# Patient Record
(demographics unavailable — no encounter records)

---

## 2025-01-10 NOTE — PHYSICAL EXAM
[TextEntry] : General:  alert, no acute distress   Head:  normocephalic   Eyes:  EOMI bilateral   Ears:  clear tympanic membranes with bony landmarks and light reflex present bilaterally   Nose:  pink nasal mucosa   Mouth:  nonerythematous oropharynx   Neck:  supple, full passive range of motion, no palpable masses   Lungs:  clear to auscultation bilaterally   Cardiac:  regular rate and rhythm, normal S1, S2 audible, no murmurs, +2 femoral pulses   Abdomen:  soft, non tender, non distended, normoactive bowel sounds, no hepatomegaly, no splenomegaly   Genitalia:  Cuco 4, left varicocele, BL testes descended, no hernias Lymphatics:  no abnormal lymph nodes palpated.   Musculoskeletal:  +3in vertical surgical scar on right wrist. no gait asymmetry, no pain or deformities with palpation of bone, muscles, joints   Spine:  very slight right thoracic prominence Neurologic:  +2 patella DTR, cranial nerves grossly intact   Skin:  no rash or lesions

## 2025-01-10 NOTE — PLAN
[TextEntry] : Continue balanced diet with all food groups. Brush teeth twice a day with toothbrush. Recommend visit to dentist. Maintain consistent daily routines and sleep schedule. Personal hygiene and puberty reviewed. School discussed. Ensure home is safe. Teach child about personal safety. Limit screen time to no more than 2 hours per day. Encourage physical activity. Return 1 year for routine well child check. Reviewed 5-2-1-0 questionnaire Cardiac questionnaire reviewed Patient may participate in all activities without restriction. Declined flu and hpv vaccines  Advised ortho f/u for decreased ROM of right wrist  Scoliosis series ordered to assess baseline curvature as patient still has growth potential  Continue to monitor left varicocele  Sadness mostly due to interpersonal relationships at school. Has had vague thoughts of self harm, but has not acted upon them. Denies any thoughts of SI. Denies any history of suicidal attempt. Offered counseling

## 2025-01-10 NOTE — HISTORY OF PRESENT ILLNESS
[Yes] : Patient goes to dentist yearly [Toothpaste] : Primary Fluoride Source: Toothpaste [Influenza] : Influenza [HPV] : HPV [Sleep Concerns] : sleep concerns [Eats regular meals including adequate fruits and vegetables] : eats regular meals including adequate fruits and vegetables [Drinks non-sweetened liquids] : drinks non-sweetened liquids  [Calcium source] : calcium source [NO] : No [At least 1 hour of physical activity a day] : at least 1 hour of physical activity a day [Uses safety belts/safety equipment] : uses safety belts/safety equipment  [No] : Patient has not had sexual intercourse [HIV Screening Declined] : HIV Screening Declined [Has problems with sleep] : has problems with sleep [Gets depressed, anxious, or irritable/has mood swings] : gets depressed, anxious, or irritable/has mood swings [With Teen] : teen [Uses electronic nicotine delivery system] : does not use electronic nicotine delivery system [Exposure to electronic nicotine delivery system] : no exposure to electronic nicotine delivery system [Uses tobacco] : does not use tobacco [Exposure to tobacco] : no exposure to tobacco [Uses drugs] : does not use drugs  [Drinks alcohol] : does not drink alcohol [Has ways to cope with stress] : does not have ways to cope with stress [Has thought about hurting self or considered suicide] : has not thought about hurting self or considered suicide [FreeTextEntry7] : 1) right wrist surgery July 2024, has not seen ortho recently for f/u but has decreased ROM of right wrist, not getting therapy. 2) invisalign appointment coming up [de-identified] : hard time falling asleep, hard time waking up, sleeps 11:30PM-midnight, wakes up at 5:30AM, TV on, texting [de-identified] : 8th grade, [employer], doing very well [de-identified] : lactaid [de-identified] : volleyball